# Patient Record
Sex: MALE | Race: WHITE | NOT HISPANIC OR LATINO | Employment: OTHER | URBAN - METROPOLITAN AREA
[De-identification: names, ages, dates, MRNs, and addresses within clinical notes are randomized per-mention and may not be internally consistent; named-entity substitution may affect disease eponyms.]

---

## 2017-06-08 RX ORDER — ALPRAZOLAM 0.25 MG/1
0.25 TABLET ORAL NIGHTLY PRN
Qty: 30 TAB | Refills: 0
Start: 2017-06-08

## 2017-06-08 NOTE — TELEPHONE ENCOUNTER
Was the patient seen in the last year in this department? No  does not have a future appointment, refilled 09/13/16    Does patient have an active prescription for medications requested? No     Received Request Via: Pharmacy

## 2019-09-16 ENCOUNTER — OFFICE VISIT (OUTPATIENT)
Dept: URGENT CARE | Facility: PHYSICIAN GROUP | Age: 34
End: 2019-09-16
Payer: COMMERCIAL

## 2019-09-16 VITALS
SYSTOLIC BLOOD PRESSURE: 130 MMHG | BODY MASS INDEX: 25.67 KG/M2 | HEIGHT: 74 IN | OXYGEN SATURATION: 98 % | DIASTOLIC BLOOD PRESSURE: 80 MMHG | HEART RATE: 87 BPM | TEMPERATURE: 98.3 F | RESPIRATION RATE: 12 BRPM | WEIGHT: 200 LBS
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
## 2019-09-16 PROCEDURE — 99213 OFFICE O/P EST LOW 20 MIN: CPT | Performed by: FAMILY MEDICINE

## 2019-09-16 RX ORDER — AMOXICILLIN AND CLAVULANATE POTASSIUM 875; 125 MG/1; MG/1
TABLET, FILM COATED ORAL
COMMUNITY
Start: 2019-09-15 | End: 2022-11-29

## 2019-09-16 SDOH — HEALTH STABILITY: MENTAL HEALTH: HOW OFTEN DO YOU HAVE A DRINK CONTAINING ALCOHOL?: 2-4 TIMES A MONTH

## 2019-09-16 NOTE — PROGRESS NOTES
"Subjective:      Chief Complaint   Patient presents with   • Dog Bite     R lower face, fever, [id number]                                  S/p dog   bite to  Right side of face 3 d ago.   He was started on  Augmentin yesterday.   He notes no worsening pain.   + subj fever yesterday, but none today.   Describes pain as soreness over rt cheek.            Social History     Tobacco Use   • Smoking status: Never Smoker   • Smokeless tobacco: Never Used   Substance Use Topics   • Alcohol use: Yes     Frequency: 2-4 times a month   • Drug use: Not on file           Current Outpatient Medications on File Prior to Visit   Medication Sig Dispense Refill   • amoxicillin-clavulanate (AUGMENTIN) 875-125 MG Tab      • alprazolam (XANAX) 0.25 MG Tab Take 1 Tab by mouth at bedtime as needed for Sleep. 30 Tab 0   • oseltamivir (TAMIFLU) 75 MG Cap Take 1 Cap by mouth 2 times a day. 10 Cap 0     No current facility-administered medications on file prior to visit.           Past medical history was unremarkable and not pertinent to current issue             Review of Systems   Constitutional: Negative for  chills and malaise/fatigue.   Eyes: Negative for vision changes, d/c.    Respiratory: Negative for cough and sputum production.    Cardiovascular: Negative for chest pain and palpitations.   Gastrointestinal: Negative for nausea, vomiting, abdominal pain, diarrhea and constipation.   Genitourinary: Negative for dysuria, urgency and frequency.   Skin: Negative for rash or  itching.   Neurological: Negative for dizziness and tingling.   Psychiatric/Behavioral: Negative for depression.   Hematologic/lymphatic - denies bruising or excessive bleeding  All other systems reviewed and are negative.       Objective:     /80   Pulse 87   Temp 36.8 °C (98.3 °F) (Temporal)   Resp 12   Ht 1.88 m (6' 2\")   Wt 90.7 kg (200 lb)   SpO2 98%       Physical Exam   Constitutional:  he is oriented to person, place, and time. Pt appears " well-developed and well-nourished. No distress.   HENT:   Face - there are several jagged, scabbed over lacerations over rt cheek, partially obscured by his beard.   There is no induration or fluctuance.   No discharge.   Head: Normocephalic and atraumatic.   Eyes: Conjunctivae are normal.   Cardiovascular: Normal rate, regular rhythm and normal heart sounds.    Pulmonary/Chest: Effort normal and breath sounds normal. No respiratory distress. Pt has no wheezes.   Musculoskeletal:   Neurological: pt is alert and oriented to person, place, and time. No cranial nerve deficit.   Skin: Skin is warm. Pt is not diaphoretic. No erythema.   Nursing note and vitals reviewed.              Assessment/Plan:       1. Dog bite, initial encounter    Pt presents 2 days after dog bite to face    He was started on augmentin yesterday - advised to continue this.     TDaP was given at time of injury    He was advised to RTC for worsening pain, fever, wound d/c, or if he does not start to see improvement in the next 2-3 days.

## 2022-11-03 ENCOUNTER — APPOINTMENT (OUTPATIENT)
Dept: URGENT CARE | Facility: PHYSICIAN GROUP | Age: 37
End: 2022-11-03
Payer: COMMERCIAL

## 2022-11-16 ENCOUNTER — HOSPITAL ENCOUNTER (OUTPATIENT)
Dept: RADIOLOGY | Facility: MEDICAL CENTER | Age: 37
End: 2022-11-16
Attending: PHYSICIAN ASSISTANT
Payer: COMMERCIAL

## 2022-11-16 ENCOUNTER — OFFICE VISIT (OUTPATIENT)
Dept: URGENT CARE | Facility: PHYSICIAN GROUP | Age: 37
End: 2022-11-16
Payer: COMMERCIAL

## 2022-11-16 VITALS
HEART RATE: 99 BPM | DIASTOLIC BLOOD PRESSURE: 100 MMHG | BODY MASS INDEX: 26.51 KG/M2 | TEMPERATURE: 99 F | HEIGHT: 73 IN | SYSTOLIC BLOOD PRESSURE: 140 MMHG | RESPIRATION RATE: 16 BRPM | OXYGEN SATURATION: 97 % | WEIGHT: 200 LBS

## 2022-11-16 DIAGNOSIS — R05.3 CHRONIC COUGH: ICD-10-CM

## 2022-11-16 PROCEDURE — 99204 OFFICE O/P NEW MOD 45 MIN: CPT | Performed by: PHYSICIAN ASSISTANT

## 2022-11-16 PROCEDURE — 71046 X-RAY EXAM CHEST 2 VIEWS: CPT

## 2022-11-16 RX ORDER — BENZONATATE 100 MG/1
100 CAPSULE ORAL 3 TIMES DAILY PRN
Qty: 21 CAPSULE | Refills: 0 | Status: SHIPPED | OUTPATIENT
Start: 2022-11-16 | End: 2022-11-29

## 2022-11-16 RX ORDER — PREDNISONE 20 MG/1
40 TABLET ORAL DAILY
Qty: 10 TABLET | Refills: 0 | Status: SHIPPED | OUTPATIENT
Start: 2022-11-16 | End: 2022-11-21

## 2022-11-16 RX ORDER — DEXTROMETHORPHAN HYDROBROMIDE AND PROMETHAZINE HYDROCHLORIDE 15; 6.25 MG/5ML; MG/5ML
5 SYRUP ORAL EVERY 4 HOURS PRN
Qty: 120 ML | Refills: 0 | Status: SHIPPED | OUTPATIENT
Start: 2022-11-16 | End: 2022-11-29

## 2022-11-16 NOTE — PROGRESS NOTES
"Subjective:   Cordell Kent is a 37 y.o. male who presents for Cough (6 -7 months when started. When first started antibiotics helped but never went away. Morning or through out the day can cough up green stuff. )      HPI  The patient presents to the Urgent Care with complaints of a cough onset 6 months ago.  Symptoms started after a cold.  The cough has been lingering and waxing and waning.  He eventually had a telemetry doc visit and he was placed on doxycycline.  He states it seemed to somewhat help with the cough but no resolution.  He had a telemetry doc visit last week and he was given albuterol inhaler and recommended Zyrtec due to possible allergies.  The cough is worse in the morning.  There is a large amount of the day throughout the day where he is not coughing at all. Denies any recent fever, chills, chest pain, hemoptysis difficulty breathing, vomiting. Nonsmoker. History of allergies once in a while but nothing significant.  He is otherwise healthy.        Medications:    ALPRAZolam Tabs  amoxicillin-clavulanate Tabs  oseltamivir Caps    Allergies: Patient has no known allergies.    Problem List: Cordell Kent does not have a problem list on file.    Surgical History:  No past surgical history on file.    Past Social Hx: Cordell Kent  reports that he has never smoked. He has never used smokeless tobacco. He reports current alcohol use. He reports that he does not use drugs.     Past Family Hx:  Cordell Kent family history is not on file.     Problem list, medications, and allergies reviewed by myself today in Epic.     Objective:     BP (!) 140/100 (BP Location: Left arm, Patient Position: Sitting, BP Cuff Size: Adult)   Pulse 99   Temp 37.2 °C (99 °F) (Temporal)   Resp 16   Ht 1.854 m (6' 1\")   Wt 90.7 kg (200 lb)   SpO2 97%   BMI 26.39 kg/m²     Physical Exam  Vitals reviewed.   Constitutional:       General: He is not in acute distress.     Appearance: Normal appearance. He is not " ill-appearing or toxic-appearing.   HENT:      Head: Normocephalic.      Nose: Nose normal.      Mouth/Throat:      Mouth: Mucous membranes are moist.      Pharynx: Oropharynx is clear. No oropharyngeal exudate or posterior oropharyngeal erythema.   Eyes:      Conjunctiva/sclera: Conjunctivae normal.      Pupils: Pupils are equal, round, and reactive to light.   Cardiovascular:      Rate and Rhythm: Normal rate and regular rhythm.      Heart sounds: Normal heart sounds.   Pulmonary:      Effort: Pulmonary effort is normal. No respiratory distress.      Breath sounds: Normal breath sounds. No wheezing, rhonchi or rales.   Musculoskeletal:      Cervical back: Neck supple.   Lymphadenopathy:      Cervical: No cervical adenopathy.   Skin:     General: Skin is warm and dry.   Neurological:      General: No focal deficit present.      Mental Status: He is alert and oriented to person, place, and time.   Psychiatric:         Mood and Affect: Mood normal.         Behavior: Behavior normal.       RADIOLOGY RESULTS   DX-CHEST-2 VIEWS    Result Date: 11/16/2022 11/16/2022 2:24 PM HISTORY/REASON FOR EXAM:  Cough TECHNIQUE/EXAM DESCRIPTION AND NUMBER OF VIEWS: Two views of the chest. COMPARISON:  None. FINDINGS: No pulmonary infiltrates or consolidations are noted. No pleural effusions, no pneumothorax are appreciated. Normal cardiopericardial silhouette.     1. No active cardiopulmonary abnormalities are identified.           Diagnosis and associated orders:     1. Chronic cough  - DX-CHEST-2 VIEWS; Future  - predniSONE (DELTASONE) 20 MG Tab; Take 2 Tablets by mouth every day for 5 days.  Dispense: 10 Tablet; Refill: 0  - benzonatate (TESSALON) 100 MG Cap; Take 1 Capsule by mouth 3 times a day as needed for Cough.  Dispense: 21 Capsule; Refill: 0  - promethazine-dextromethorphan (PROMETHAZINE-DM) 6.25-15 MG/5ML syrup; Take 5 mL by mouth every four hours as needed for Cough.  Dispense: 120 mL; Refill: 0  - Referral to establish  with [facility] PCP     Comments/MDM:     X-ray results per radiologist interpretation above. I personally reviewed images and radiologist report which showed no abnormalities.   Discussed with patient unclear etiology of his chronic cough.  Patient is otherwise well-appearing in no acute distress.  Normal oxygen.  Afebrile.  No evidence of pneumonia.  Trial of medication as above.  Avoid any triggers.  Continue Zyrtec.    Referral placed to establish care with PCP.       I personally reviewed prior external notes and test results pertinent to today's visit. Pathogenesis of diagnosis discussed including typical length and natural progression. Supportive care, natural history, differential diagnoses, and indications for immediate follow-up discussed. Patient expresses understanding and agrees to plan. Patient denies any other questions or concerns.     Follow-up with the primary care physician for recheck, reevaluation, and consideration of further management.    Please note that this dictation was created using voice recognition software. I have made a reasonable attempt to correct obvious errors, but I expect that there are errors of grammar and possibly content that I did not discover before finalizing the note.    This note was electronically signed by Lm Gant PA-C

## 2022-11-17 ENCOUNTER — TELEPHONE (OUTPATIENT)
Dept: SCHEDULING | Facility: IMAGING CENTER | Age: 37
End: 2022-11-17

## 2022-11-29 ENCOUNTER — OFFICE VISIT (OUTPATIENT)
Dept: MEDICAL GROUP | Facility: PHYSICIAN GROUP | Age: 37
End: 2022-11-29
Payer: COMMERCIAL

## 2022-11-29 VITALS
RESPIRATION RATE: 13 BRPM | TEMPERATURE: 98 F | HEIGHT: 73 IN | OXYGEN SATURATION: 97 % | BODY MASS INDEX: 25.84 KG/M2 | HEART RATE: 97 BPM | SYSTOLIC BLOOD PRESSURE: 138 MMHG | DIASTOLIC BLOOD PRESSURE: 74 MMHG | WEIGHT: 195 LBS

## 2022-11-29 DIAGNOSIS — Z00.00 WELLNESS EXAMINATION: ICD-10-CM

## 2022-11-29 DIAGNOSIS — R05.3 CHRONIC COUGH: ICD-10-CM

## 2022-11-29 DIAGNOSIS — Z11.59 NEED FOR HEPATITIS C SCREENING TEST: ICD-10-CM

## 2022-11-29 DIAGNOSIS — Z76.89 ENCOUNTER TO ESTABLISH CARE: ICD-10-CM

## 2022-11-29 DIAGNOSIS — E78.5 DYSLIPIDEMIA: ICD-10-CM

## 2022-11-29 PROBLEM — R05.9 COUGH: Status: ACTIVE | Noted: 2022-11-29

## 2022-11-29 PROCEDURE — 99395 PREV VISIT EST AGE 18-39: CPT | Performed by: STUDENT IN AN ORGANIZED HEALTH CARE EDUCATION/TRAINING PROGRAM

## 2022-11-29 ASSESSMENT — PATIENT HEALTH QUESTIONNAIRE - PHQ9: CLINICAL INTERPRETATION OF PHQ2 SCORE: 0

## 2022-11-29 NOTE — PROGRESS NOTES
Subjective:     CC:  establish care    HISTORY OF THE PRESENT ILLNESS: Patient is a 37 y.o. male here today to establish care and discuss cough.    Cough  Seen at Urgent Care 11/16/22 with cough x 6 months that started after a cold. He had a teledoc visit and was placed on doxycycline.  He had another teledoc visit and was given albuterol inhaler and recommended Zyrtec due to possible allergies.  CXR in urgent care was unremarkable.  Patient was given prednisone x 5 days, tessalon perles, and promethazine.  Patient reports the cough is worst in the morning and is productive with green phlegm.  Patient reports minimal improvement with the albuterol inhaler for 1 week and Zyrtec for 2 weeks.  Patient has been doing saline nasal rinses which she reports slightly helped.  Patient feels a constant need to clear his throat and reports he only occasionally has seasonal allergies.  Patient has cats and dogs at home.    Dyslipidemia  Patient reports he had labs done 2 years ago for life insurance and at that time cholesterol was elevated.    Health Maintenance: Completed  Cholesterol Screening: Ordered today  Diabetes Screening: Ordered today  Diet / Exercise: BMI 25.73  Smoking: denies  Substance Abuse: reports drinking 3-4 alcoholic beverages 3-4 nights per week but denies illicit drug use  Safe in relationship: yes,     Infectious disease screening/Immunizations  --STI Screening: declined  --Practices safe sex.  --Hepatitis C Screening: ordered today  --Immunizations:    Influenza: declined   Tetanus: UTD     Hepatitis B: patient will check his vaccine records  COVID-19: declined    No Known Allergies  Patient Active Problem List   Diagnosis    Cough    Dyslipidemia     No current outpatient medications on file.     No current facility-administered medications for this visit.     Past Surgical History:   Procedure Laterality Date    OTHER ORTHOPEDIC SURGERY Left     labrum tear    TONSILLECTOMY        Social  History     Socioeconomic History    Marital status:      Spouse name: Not on file    Number of children: 1    Years of education: Not on file    Highest education level: Not on file   Occupational History    Occupation: self-employed tech   Tobacco Use    Smoking status: Never    Smokeless tobacco: Never   Vaping Use    Vaping Use: Never used   Substance and Sexual Activity    Alcohol use: Yes     Comment: 3-4 drinks 3-4 nights/wk    Drug use: Never    Sexual activity: Yes     Partners: Female     Birth control/protection: Female Sterilization     Comment: wife   Other Topics Concern    Not on file   Social History Narrative    Lives with wife and son.     Social Determinants of Health     Financial Resource Strain: Not on file   Food Insecurity: Not on file   Transportation Needs: Not on file   Physical Activity: Not on file   Stress: Not on file   Social Connections: Not on file   Intimate Partner Violence: Not on file   Housing Stability: Not on file     Family History   Problem Relation Age of Onset    Breast Cancer Maternal Grandmother     Ovarian Cancer Neg Hx     Tubal Cancer Neg Hx     Peritoneal Cancer Neg Hx     Colorectal Cancer Neg Hx          ROS:     Constitutional:  Negative for chills, fever, fatigue, weight loss.  HEENT:  Negative for blurred vision, hearing loss, sore throat.    Respiratory: Positive for cough with green sputum production but negative for shortness of breath.  Cardiovascular:  Negative for chest pain, palpitations and leg swelling.  Gastrointestinal:  Negative for abdominal pain, blood in stool, constipation, diarrhea and vomiting.   Musculoskeletal:  Negative for back pain, falls, joint pain and neck pain.   Skin:  Negative for rash.   Neurological:  Negative for dizziness, seizures, weakness and headaches.   Endo/Heme/Allergies:  Does not bruise/bleed easily.   Psychiatric/Behavioral:  Negative for depression, anxiety and suicidal thoughts.      Objective:     Exam: BP  "138/74   Pulse 97   Temp 36.7 °C (98 °F) (Temporal)   Resp 13   Ht 1.854 m (6' 1\")   Wt 88.5 kg (195 lb)   SpO2 97%  Body mass index is 25.73 kg/m².    Gen: Alert and oriented, no acute distress.  Eyes:  PERRL, conjunctivae clear, lids normal.   ENMT: Lips without lesions, good dentition, moist mucous membranes, no oropharyngeal erythema or exudate.  Neck: Neck is supple, trachea middle, no palpable lymphadenopathy or thyromegaly.  Lungs: Normal effort, CTAB, no wheezing / rhonchi / rales.  CV: RRR, normal S1 and S2, no murmurs.  GI:  Abdomen soft, non-tender, non-distended with normal bowel sounds.  MSK:  Normal ROM.  Ext: No clubbing, cyanosis, or edema.  Skin:  Warm and dry with no rashes or lesions.  Neuro: AAO x 3, no acute focal deficits.  Psych: Normal affect and mood.      Assessment & Plan:   37 y.o. male with the following -    1. Encounter to establish care  2. Wellness examination  Patient presents today to establish care and annual preventive exam was done.  Routine labs ordered.  Patient will check his records to see if he received the hepatitis B vaccines.  - CBC WITH DIFFERENTIAL; Future  - Comp Metabolic Panel; Future    3. Chronic cough  Chronic.  Morning cough present for the past 6 months.  No improvement with doxycycline, albuterol inhaler, Zyrtec.  We had a lengthy discussion about the causes of a chronic cough.  It is unlikely that the patient has asthma and he denies any shortness of breath.  Symptoms may possibly be due to allergies or acid reflux.  We discussed doing a 1 month trial of Zyrtec plus Flonase daily followed by a 1 month trial of daily PPI.  Follow-up in 2 months and if no improvement consider referral to ENT.    4. Dyslipidemia  Chronic.  Patient reports elevated cholesterol in the past.  Repeat lipid panel ordered.  - Lipid Profile; Future    5. Need for hepatitis C screening test  One-time screening for hepatitis C ordered.  - HCV Scrn ( [id number] 1xLife); " Future          Patient Counseling:  --Discussed moderation in sodium/caffeine intake, saturated fat and cholesterol, caloric balance, sufficient fresh fruits/vegetables, fiber.  --Discussed brushing, flossing, and dental visits.   --Encouraged 150 minutes of exercise weekly.   --Discussed tobacco, alcohol, and other drug use.   --Discussed safety belts, safety helmets, smoke detector, gun safety etc.  --Discussed sun protection with minimum of spf 30.      Return in about 2 months (around 1/29/2023) for follow-up cough.    Please note that this dictation was created using voice recognition software. I have made every reasonable attempt to correct obvious errors, but I expect that there are errors of grammar and possibly content that I did not discover before finalizing the note.

## 2022-11-29 NOTE — ASSESSMENT & PLAN NOTE
Seen at Urgent Care 11/16/22 with cough x 6 months that started after a cold. He had a teledoc visit and was placed on doxycycline.  He had another teledoc visit and was given albuterol inhaler and recommended Zyrtec due to possible allergies.  CXR in urgent care was unremarkable.  Patient was given prednisone x 5 days, tessalon perles, and promethazine.  Patient reports the cough is worst in the morning and is productive with green phlegm.  Patient reports minimal improvement with the albuterol inhaler for 1 week and Zyrtec for 2 weeks.  Patient has been doing saline nasal rinses which she reports slightly helped.  Patient feels a constant need to clear his throat and reports he only occasionally has seasonal allergies.  Patient has cats and dogs at home.

## 2022-11-29 NOTE — ASSESSMENT & PLAN NOTE
Patient reports he had labs done 2 years ago for life insurance and at that time cholesterol was elevated.

## 2022-12-28 ENCOUNTER — OFFICE VISIT (OUTPATIENT)
Dept: MEDICAL GROUP | Facility: PHYSICIAN GROUP | Age: 37
End: 2022-12-28
Payer: COMMERCIAL

## 2022-12-28 ENCOUNTER — HOSPITAL ENCOUNTER (EMERGENCY)
Facility: MEDICAL CENTER | Age: 37
End: 2022-12-28
Attending: EMERGENCY MEDICINE
Payer: COMMERCIAL

## 2022-12-28 VITALS
HEART RATE: 82 BPM | OXYGEN SATURATION: 97 % | WEIGHT: 197.31 LBS | SYSTOLIC BLOOD PRESSURE: 173 MMHG | BODY MASS INDEX: 26.03 KG/M2 | DIASTOLIC BLOOD PRESSURE: 99 MMHG | RESPIRATION RATE: 18 BRPM | TEMPERATURE: 97.9 F

## 2022-12-28 VITALS
OXYGEN SATURATION: 98 % | HEIGHT: 73 IN | BODY MASS INDEX: 26.11 KG/M2 | WEIGHT: 197 LBS | SYSTOLIC BLOOD PRESSURE: 140 MMHG | DIASTOLIC BLOOD PRESSURE: 92 MMHG | RESPIRATION RATE: 13 BRPM | TEMPERATURE: 98.1 F | HEART RATE: 87 BPM

## 2022-12-28 DIAGNOSIS — G96.00 CSF LEAK: ICD-10-CM

## 2022-12-28 DIAGNOSIS — J32.3 CHRONIC SPHENOIDAL SINUSITIS: ICD-10-CM

## 2022-12-28 DIAGNOSIS — G96.01 CSF (CEREBROSPINAL RHINORRHEA): ICD-10-CM

## 2022-12-28 LAB
ANION GAP SERPL CALC-SCNC: 12 MMOL/L (ref 7–16)
APTT PPP: 28.4 SEC (ref 24.7–36)
BASOPHILS # BLD AUTO: 0.3 % (ref 0–1.8)
BASOPHILS # BLD: 0.03 K/UL (ref 0–0.12)
BUN SERPL-MCNC: 11 MG/DL (ref 8–22)
CALCIUM SERPL-MCNC: 10.1 MG/DL (ref 8.5–10.5)
CHLORIDE SERPL-SCNC: 103 MMOL/L (ref 96–112)
CO2 SERPL-SCNC: 24 MMOL/L (ref 20–33)
CREAT SERPL-MCNC: 0.93 MG/DL (ref 0.5–1.4)
EOSINOPHIL # BLD AUTO: 0.03 K/UL (ref 0–0.51)
EOSINOPHIL NFR BLD: 0.3 % (ref 0–6.9)
ERYTHROCYTE [DISTWIDTH] IN BLOOD BY AUTOMATED COUNT: 40.1 FL (ref 35.9–50)
GFR SERPLBLD CREATININE-BSD FMLA CKD-EPI: 109 ML/MIN/1.73 M 2
GLUCOSE SERPL-MCNC: 105 MG/DL (ref 65–99)
HCT VFR BLD AUTO: 49.3 % (ref 42–52)
HGB BLD-MCNC: 16.6 G/DL (ref 14–18)
IMM GRANULOCYTES # BLD AUTO: 0.04 K/UL (ref 0–0.11)
IMM GRANULOCYTES NFR BLD AUTO: 0.4 % (ref 0–0.9)
INR PPP: 1.02 (ref 0.87–1.13)
LYMPHOCYTES # BLD AUTO: 1.51 K/UL (ref 1–4.8)
LYMPHOCYTES NFR BLD: 13.4 % (ref 22–41)
MCH RBC QN AUTO: 29.4 PG (ref 27–33)
MCHC RBC AUTO-ENTMCNC: 33.7 G/DL (ref 33.7–35.3)
MCV RBC AUTO: 87.4 FL (ref 81.4–97.8)
MONOCYTES # BLD AUTO: 0.72 K/UL (ref 0–0.85)
MONOCYTES NFR BLD AUTO: 6.4 % (ref 0–13.4)
NEUTROPHILS # BLD AUTO: 8.93 K/UL (ref 1.82–7.42)
NEUTROPHILS NFR BLD: 79.2 % (ref 44–72)
NRBC # BLD AUTO: 0 K/UL
NRBC BLD-RTO: 0 /100 WBC
PLATELET # BLD AUTO: 363 K/UL (ref 164–446)
PMV BLD AUTO: 9.8 FL (ref 9–12.9)
POTASSIUM SERPL-SCNC: 4.2 MMOL/L (ref 3.6–5.5)
PROTHROMBIN TIME: 13.2 SEC (ref 12–14.6)
RBC # BLD AUTO: 5.64 M/UL (ref 4.7–6.1)
SODIUM SERPL-SCNC: 139 MMOL/L (ref 135–145)
WBC # BLD AUTO: 11.3 K/UL (ref 4.8–10.8)

## 2022-12-28 PROCEDURE — 80048 BASIC METABOLIC PNL TOTAL CA: CPT

## 2022-12-28 PROCEDURE — 99283 EMERGENCY DEPT VISIT LOW MDM: CPT

## 2022-12-28 PROCEDURE — 99215 OFFICE O/P EST HI 40 MIN: CPT | Performed by: STUDENT IN AN ORGANIZED HEALTH CARE EDUCATION/TRAINING PROGRAM

## 2022-12-28 PROCEDURE — 85730 THROMBOPLASTIN TIME PARTIAL: CPT

## 2022-12-28 PROCEDURE — 85610 PROTHROMBIN TIME: CPT

## 2022-12-28 PROCEDURE — 85025 COMPLETE CBC W/AUTO DIFF WBC: CPT

## 2022-12-28 PROCEDURE — 36415 COLL VENOUS BLD VENIPUNCTURE: CPT

## 2022-12-28 RX ORDER — AMOXICILLIN AND CLAVULANATE POTASSIUM 875; 125 MG/1; MG/1
1 TABLET, FILM COATED ORAL 2 TIMES DAILY
Qty: 28 TABLET | Refills: 0 | Status: SHIPPED | OUTPATIENT
Start: 2022-12-28 | End: 2023-01-11

## 2022-12-28 NOTE — ED TRIAGE NOTES
Chief Complaint   Patient presents with    Sent by MD     Seen by MD, sent pt over for a CSF leak per PCP who witnessed fluid drain from nose.        BP (!) 173/99   Pulse 82   Temp 36.6 °C (97.9 °F) (Temporal)   Resp 18   SpO2 97%

## 2022-12-28 NOTE — PROGRESS NOTES
"Subjective:     CC: Fluid leaking from nose    HPI:   Cordell presents today with clear fluid leaking from his nose.  At the last visit patient reported chronic cough for 9 months for which we were doing a trial of Zyrtec plus Flonase followed by a trial of PPI.  Patient reports 2 weeks after starting Flonase he developed headaches and eventually started getting nosebleeds.  Patient stopped the Flonase 10 to 12 days ago.  Patient reports yesterday he bent over in his garage and when he stood up 10 to 15 drops of clear fluid came out of his nose.  Patient reports he bent over again last night and once again clear fluid came out of his nose.  Patient was concerned about a CSF leak and had imaging done at his friend's dental office this morning which showed an abnormality but no radiology report is available yet.  During our visit the patient bent over and a few drops of clear fluid fell out of his nose onto tissue that appeared to have a halo sign.  Patient denies any head trauma in the past.  Patient was advised to go to the emergency room for further evaluation and agreed.    Health Maintenance: Completed    ROS:  Negative except as stated above.    Objective:     Exam:  BP (!) 140/92   Pulse 87   Temp 36.7 °C (98.1 °F) (Temporal)   Resp 13   Ht 1.854 m (6' 1\")   Wt 89.4 kg (197 lb)   SpO2 98%   BMI 25.99 kg/m²  Body mass index is 25.99 kg/m².    Physical Exam    Gen: Alert and oriented, no acute distress.  Nose:  Drops of clear fluid fell from the nose when patient bent over.  Lungs: Normal effort, CTAB, no wheezing / rhonchi / rales.  CV: RRR, normal S1 and S2, no murmurs.    Assessment & Plan:     37 y.o. male with the following -     1. CSF (cerebrospinal rhinorrhea)  Findings are concerning for CSF leak.  Patient had imaging done this morning but no radiology report is available yet.  I advised the patient to go to the emergency room for further evaluation and he agreed.      Return in about 4 weeks " (around 1/25/2023) for cough, rhinorrhea.    Please note that this dictation was created using voice recognition software. I have made every reasonable attempt to correct obvious errors, but I expect that there are errors of grammar and possibly content that I did not discover before finalizing the note.

## 2022-12-29 DIAGNOSIS — G96.00 CSF LEAK: ICD-10-CM

## 2022-12-29 DIAGNOSIS — J32.3 CHRONIC SPHENOIDAL SINUSITIS: ICD-10-CM

## 2022-12-29 NOTE — ED PROVIDER NOTES
ED Provider Note  2  ED Provider Note    History limited by: nothing    CHIEF COMPLAINT  Chief Complaint   Patient presents with    Sent by MD     Seen by MD, sent pt over for a CSF leak per PCP who witnessed fluid drain from nose.       DILSHAD Kent is a 37 y.o. male who presents to the Emergency Department sent in by urgent care for concerns of a CSF leak.  The patient has had a chronic cough for 9 months that tends to be worse in the morning and tends to be associated with green sputum.  He has taken a course of doxycycline which he tolerated poorly without benefit.  He has been tried on antihistamines, albuterol without benefit.  His doctor's next plan is to empirically treat for GERD.  For the last 2 to 3 days when he bends over a watery material comes out of the left side of his nose.  Today at urgent care this was tested on paper and produced a halo consistent with CSF.  He denies headache, fever, ill appearance, sinus pain, severe congestion.  No head trauma.  Over the last 9 months he has had intermittent random fevers every week to a month.  No diabetes or immune compromise.  A dentist friend performed a CT head on the patient today demonstrating left sphenoid sinus wall erosion near the carotid.      REVIEW OF SYSTEMS  Pertinent positives include: Leakage of fluid from left nostril, persistent cough productive of green phlegm.  Pertinent negatives include: Fever, shortness of breath, headache, neck stiffness, vomiting, ill appearance.  10+ systems reviewed and negative.      PAST MEDICAL HISTORY  None including no diabetes or known immune compromise    FAMILY HISTORY  Family History   Problem Relation Age of Onset    Breast Cancer Maternal Grandmother     Ovarian Cancer Neg Hx     Tubal Cancer Neg Hx     Peritoneal Cancer Neg Hx     Colorectal Cancer Neg Hx        SOCIAL HISTORY  Social History     Tobacco Use    Smoking status: Never    Smokeless tobacco: Never   Vaping Use    Vaping Use: Never  used   Substance Use Topics    Alcohol use: Yes     Comment: 3-4 drinks 3-4 nights/wk    Drug use: Never     Social History     Substance and Sexual Activity   Drug Use Never       SURGICAL HISTORY  Past Surgical History:   Procedure Laterality Date    OTHER ORTHOPEDIC SURGERY Left     labrum tear    TONSILLECTOMY         CURRENT MEDICATIONS  Albuterol, antihistamine    ALLERGIES  No Known Allergies    PHYSICAL EXAM  VITAL SIGNS: BP (!) 173/99   Pulse 82   Temp 36.6 °C (97.9 °F) (Temporal)   Resp 18   Wt 89.5 kg (197 lb 5 oz)   SpO2 97%   BMI 26.03 kg/m²   Reviewed and elevated blood pressure, afebrile.  Constitutional: Well developed, Well nourished, afebrile, elevated blood pressure, no hypoxia room air.  HENT: Normocephalic, atraumatic, bilateral external ears normal, wearing a mask.  Mild nasal mucosal edema bilaterally with scant dried mucus, no sinus tenderness, no meningismus or nuchal rigidity, no active drainage from the nostril, no intraoral erythema edema or exudate  Eyes: PERRLA, conjunctiva pink, no scleral icterus.   Cardiovascular: Regular rate and rhythm. No murmurs, rubs or gallops.  No dependent edema or calf tenderness  Respiratory: Lungs clear to auscultation bilaterally. No wheezes, rales, or rhonchi.  No frequent cough  Abdominal:  Abdomen soft, non-tender, non distended. No rebound, or guarding.    Skin: No erythema, no rash.   Genitourinary: No costovertebral angle tenderness.   Musculoskeletal: no deformities.   Neurologic: Alert & oriented x 3, cranial nerves 2-12 intact by passive exam.  No focal deficit noted.  Psychiatric: Affect normal, Judgment normal, Mood normal.     ED COURSE:  5:02 PM - Patient seen and examined at bedside.   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    Radiologist interpretation have been reviewed by me.       LABORATORY Ordered and Reviewed by Me:  Results for orders placed  or performed during the hospital encounter of 12/28/22   CBC WITH DIFFERENTIAL   Result Value Ref Range    WBC 11.3 (H) 4.8 - 10.8 K/uL    RBC 5.64 4.70 - 6.10 M/uL    Hemoglobin 16.6 14.0 - 18.0 g/dL    Hematocrit 49.3 42.0 - 52.0 %    MCV 87.4 81.4 - 97.8 fL    MCH 29.4 27.0 - 33.0 pg    MCHC 33.7 33.7 - 35.3 g/dL    RDW 40.1 35.9 - 50.0 fL    Platelet Count 363 164 - 446 K/uL    MPV 9.8 9.0 - 12.9 fL    Neutrophils-Polys 79.20 (H) 44.00 - 72.00 %    Lymphocytes 13.40 (L) 22.00 - 41.00 %    Monocytes 6.40 0.00 - 13.40 %    Eosinophils 0.30 0.00 - 6.90 %    Basophils 0.30 0.00 - 1.80 %    Immature Granulocytes 0.40 0.00 - 0.90 %    Nucleated RBC 0.00 /100 WBC    Neutrophils (Absolute) 8.93 (H) 1.82 - 7.42 K/uL    Lymphs (Absolute) 1.51 1.00 - 4.80 K/uL    Monos (Absolute) 0.72 0.00 - 0.85 K/uL    Eos (Absolute) 0.03 0.00 - 0.51 K/uL    Baso (Absolute) 0.03 0.00 - 0.12 K/uL    Immature Granulocytes (abs) 0.04 0.00 - 0.11 K/uL    NRBC (Absolute) 0.00 K/uL   Basic Metabolic Panel   Result Value Ref Range    Sodium 139 135 - 145 mmol/L    Potassium 4.2 3.6 - 5.5 mmol/L    Chloride 103 96 - 112 mmol/L    Co2 24 20 - 33 mmol/L    Glucose 105 (H) 65 - 99 mg/dL    Bun 11 8 - 22 mg/dL    Creatinine 0.93 0.50 - 1.40 mg/dL    Calcium 10.1 8.5 - 10.5 mg/dL    Anion Gap 12.0 7.0 - 16.0   Prothrombin Time   Result Value Ref Range    PT 13.2 12.0 - 14.6 sec    INR 1.02 0.87 - 1.13   APTT   Result Value Ref Range    APTT 28.4 24.7 - 36.0 sec   ESTIMATED GFR   Result Value Ref Range    GFR (CKD-EPI) 109 >60 mL/min/1.73 m 2       External Records Reviewed:   External CT report reviewed as above    Discussions of Management:    Case discussed with Dr. Pagan ears nose throat and we decided not to treat with antibiotics now unless he develops any symptoms suggesting new acute infection.  She will obtain a MRI of the head as an outpatient.  She will follow the patient up in clinic this week.  The patient is to call tomorrow for his  appointments.    MEDICAL DECISION MAKING:  Differential Diagnosis:  Chronic sinusitis, CSF leak, sphenoid wall erosion, brain abscess, meningitis, acute sinusitis, cavernous sinus thrombosis    Consideration of Hospitalization/Surgery/Testing:   Hospitalization considered for MRI, antibiotics and urgent ENT consultation however Dr. Pagan dissuaded this since this problem in a well-appearing patient is normally worked up and treated as an outpatient.    5:02 PM - Nursing Notes Reviewed, Discharge vitals reviewed:    This patient presents with a probable CSF leak from his left nostril and has sphenoid bone erosion on the left on CT imaging today.  There is no history of trauma.  He is likely had a chronic sinusitis for 9 months.  There is no evidence of acute sinusitis.  There is no evidence of brain abscess, meningitis or cavernous sinus thrombosis.  He has no immune compromise.  This is still an urgent matter for treatment to avoid complications such as brain abscess meningitis and cavernous sinus thrombosis.  Patient plan to [place] this [place] and he was dissuaded from this until he follows up with ENT.    PLAN:  New/Changed Prescription Medication:  Discharge Medication List as of 12/28/2022  6:01 PM        START taking these medications    Details   amoxicillin-clavulanate (AUGMENTIN) 875-125 MG Tab Take 1 Tablet by mouth 2 times a day for 14 days., Disp-28 Tablet, R-0, Print Rx Paper           Augmentin to be started at the first sign of infection    Sinusitis handout given    Return for severe symptoms of infection, neck stiffness, recurrent vomiting, confusion, neurologic symptoms    Followup:  Zee Pagan M.D.  [address] [place] [id number]  [phone]    Call   Call tomorrow for appointment with Dr. Pagan, for MRI order, and to ask if you should travel      CONDITION: Stable.     FINAL IMPRESSION  1. CSF leak    2. Chronic sphenoidal sinusitis          Electronically signed by: Carl Diana M.D., 12/28/2022 5:02 PM

## 2022-12-29 NOTE — DISCHARGE INSTRUCTIONS
Your CSF leak is probably from a chronic sinusitis.  At this point there is no evidence of acute infection and Dr. Guzman prefers that you not to take an antibiotic unless you develop new symptoms such as puslike drainage from the left nostril, fever, face pain, headache.  If you develop severe infection symptoms like severe headache neck pain vomiting and high fever or if you develop any neurologic symptoms like severe headache double vision or other neurologic symptoms return immediately to this ER.

## 2022-12-29 NOTE — ED NOTES
Patient discharged in stable condition per orders. IV access removed - bandage applied. Wristband removed per protocol. Patient verbalized understanding of all discharge instructions. All belongings accounted for. Ambulatory for discharge with steady gait.

## 2022-12-29 NOTE — PROGRESS NOTES
Patient was seen in the clinic yesterday and sent to the emergency room for CSF leak.  CT showed sphenoid bone erosion.  MRI and outpatient follow-up with ENT was advised.  Patient is scheduled to see ENT on 1/11/23.  Urgent MRI and ENT referral ordered today.

## 2023-01-03 ENCOUNTER — HOSPITAL ENCOUNTER (OUTPATIENT)
Dept: RADIOLOGY | Facility: MEDICAL CENTER | Age: 38
End: 2023-01-03
Attending: OTOLARYNGOLOGY
Payer: COMMERCIAL

## 2023-01-03 DIAGNOSIS — G96.01 CEREBROSPINAL FLUID RHINORRHEA: ICD-10-CM

## 2023-01-03 PROCEDURE — A9579 GAD-BASE MR CONTRAST NOS,1ML: HCPCS | Performed by: OTOLARYNGOLOGY

## 2023-01-03 PROCEDURE — 700117 HCHG RX CONTRAST REV CODE 255: Performed by: OTOLARYNGOLOGY

## 2023-01-03 PROCEDURE — 70553 MRI BRAIN STEM W/O & W/DYE: CPT

## 2023-01-03 RX ADMIN — GADOTERIDOL 19 ML: 279.3 INJECTION, SOLUTION INTRAVENOUS at 11:37

## 2023-02-05 ENCOUNTER — TELEPHONE (OUTPATIENT)
Dept: MEDICAL GROUP | Facility: PHYSICIAN GROUP | Age: 38
End: 2023-02-05
Payer: COMMERCIAL

## 2023-09-08 ENCOUNTER — OFFICE VISIT (OUTPATIENT)
Dept: MEDICAL GROUP | Facility: PHYSICIAN GROUP | Age: 38
End: 2023-09-08
Payer: COMMERCIAL

## 2023-09-08 VITALS
HEIGHT: 73 IN | TEMPERATURE: 98.6 F | DIASTOLIC BLOOD PRESSURE: 88 MMHG | SYSTOLIC BLOOD PRESSURE: 136 MMHG | OXYGEN SATURATION: 98 % | HEART RATE: 88 BPM | BODY MASS INDEX: 26.24 KG/M2 | WEIGHT: 198 LBS

## 2023-09-08 DIAGNOSIS — Z00.00 ROUTINE HEALTH MAINTENANCE: ICD-10-CM

## 2023-09-08 DIAGNOSIS — K92.1 BLOOD IN STOOL: ICD-10-CM

## 2023-09-08 DIAGNOSIS — Z11.59 NEED FOR HEPATITIS C SCREENING TEST: ICD-10-CM

## 2023-09-08 DIAGNOSIS — R05.3 CHRONIC COUGH: ICD-10-CM

## 2023-09-08 DIAGNOSIS — Z11.4 ENCOUNTER FOR SCREENING FOR HIV: ICD-10-CM

## 2023-09-08 PROCEDURE — 3079F DIAST BP 80-89 MM HG: CPT | Performed by: STUDENT IN AN ORGANIZED HEALTH CARE EDUCATION/TRAINING PROGRAM

## 2023-09-08 PROCEDURE — 3075F SYST BP GE 130 - 139MM HG: CPT | Performed by: STUDENT IN AN ORGANIZED HEALTH CARE EDUCATION/TRAINING PROGRAM

## 2023-09-08 PROCEDURE — 99214 OFFICE O/P EST MOD 30 MIN: CPT | Performed by: STUDENT IN AN ORGANIZED HEALTH CARE EDUCATION/TRAINING PROGRAM

## 2023-09-08 ASSESSMENT — PATIENT HEALTH QUESTIONNAIRE - PHQ9: CLINICAL INTERPRETATION OF PHQ2 SCORE: 0

## 2023-09-08 NOTE — PROGRESS NOTES
Subjective:     Chief Complaint   Patient presents with    Diarrhea     Diarrhea 1 day on the 4th, abdominal pain, bright red blood in stool x4 days. / started carnivore diet recently       HPI:   Cordell presents today with    Cough  Seen in urgent Care 11/16/22 for cough x 6 months that started after a cold.  Patient had a teledoc visit and was placed on doxycycline.  He had another teledoc visit and was given albuterol inhaler and Zyrtec recommended due to possible allergies.  CXR was unremarkable.  Patient was given prednisone x 5 days, tessalon perles, and promethazine.  Patient still complains of occasional morning cough that is productive with green phlegm.  Patient reports no improvement with albuterol, Zyrtec, Flonase, and PPI.  Patient still feels constant need to clear his throat.  Patient has cats and dogs at home.    Blood in stool  Patient reports 3 weeks ago he started the carnivore diet.  Patient reports on Monday he went to the [facility] where he ate a brisket sandwich with mac & cheese.  Patient reports the following day he had pizza and wings but that night he had an episode of loose stools.  Patient reports on Wednesday he had a few episodes of mucus and blood in the toilet bowl.  Picture on patient's phone showed bright red blood in the toilet without any stool.  Patient reports it was associated with abdominal pain that resolved after the bowel movement.  Patient reports the bleeding has resolved but he occasionally has gas.  Patient denies fever, nausea, vomiting, sick contacts, similar issue in the past, constipation, recent travel, or NSAID use.  Patient reports he last took antibiotics a couple months ago.  Patient denies FH of IBD or cholecystectomy.        Health Maintenance: Completed    ROS:  Negative except as stated above.      Objective:     Exam:  /88 (BP Location: Left arm, Patient Position: Sitting, BP Cuff Size: Adult)   Pulse 88   Temp 37 °C (98.6 °F) (Temporal)   Ht 1.854  "m (6' 1\")   Wt 89.8 kg (198 lb)   SpO2 98%   BMI 26.12 kg/m²  Body mass index is 26.12 kg/m².    Physical Exam    Gen: Alert and oriented, no acute distress.  Eyes:  PERRL, conjunctivae clear, lids normal.   ENMT: Lips without lesions, good dentition, moist mucous membranes.  Neck: Neck is supple, trachea middle, no palpable lymphadenopathy or thyromegaly.  Lungs: Normal effort, CTAB, no wheezing / rhonchi / rales.  CV: RRR, normal S1 and S2, no murmurs.  GI:  Abdomen soft, non-tender, non-distended with normal bowel sounds and no palpable masses.  MSK:  Normal ROM.  Ext: No clubbing, cyanosis, or edema.  Skin:  Warm and dry with no rashes or lesions.  Neuro: AAO x 3, no acute focal deficits.  Psych: Normal affect and mood.      Assessment & Plan:     38 y.o. male with the following -     1. Routine health maintenance  - CBC WITH DIFFERENTIAL; Future  - Comp Metabolic Panel; Future  - Lipid Profile; Future    2. Chronic cough  Chronic, uncontrolled.  Symptoms seem consistent with allergies but patient reported no improvement with albuterol, Zyrtec, Flonase, and PPI.  November 2022 CXR is unremarkable.  Follows up with ENT and reports surgery for deviated septum was recommended.    3. Blood in stool  This is a new problem.  Picture on patient's phone showed bright red blood in the toilet bowl.  Patient reports the bleeding has now resolved.  Cause is unclear.  Possibly due to recent change in diet.  Advised to limit alcohol use. If bleeding returns order work-up for IBD.    4. Need for hepatitis C screening test  - HEP C VIRUS ANTIBODY; Future    5. Encounter for screening for HIV  - HIV AG/AB COMBO ASSAY SCREENING; Future          I spent a total of 35 minutes with record review, exam, communication with the patient, communication with other providers, and documentation of this encounter.      Return in about 3 months (around 12/8/2023) for Annual preventive visit, Discuss labs.    Please note that this dictation " was created using voice recognition software. I have made every reasonable attempt to correct obvious errors, but I expect that there are errors of grammar and possibly content that I did not discover before finalizing the note.

## 2023-09-08 NOTE — ASSESSMENT & PLAN NOTE
Seen in urgent Care 11/16/22 for cough x 6 months that started after a cold.  Patient had a teledoc visit and was placed on doxycycline.  He had another teledoc visit and was given albuterol inhaler and Zyrtec recommended due to possible allergies.  CXR was unremarkable.  Patient was given prednisone x 5 days, tessalon perles, and promethazine.  Patient still complains of occasional morning cough that is productive with green phlegm.  Patient reports no improvement with albuterol, Zyrtec, Flonase, and PPI.  Patient still feels constant need to clear his throat.  Patient has cats and dogs at home.

## 2023-09-08 NOTE — ASSESSMENT & PLAN NOTE
Patient reports 3 weeks ago he started the carnivore diet.  Patient reports on Monday he went to the [facility] fast where he ate a brisket sandwich with mac & cheese.  Patient reports the following day he had pizza and wings but that night he had an episode of loose stools.  Patient reports on Wednesday he had a few episodes of mucus and blood in the toilet bowl.  Picture on patient's phone showed bright red blood in the toilet without any stool.  Patient reports it was associated with abdominal pain that resolved after the bowel movement.  Patient reports the bleeding has resolved but he occasionally has gas.  Patient denies fever, nausea, vomiting, sick contacts, similar issue in the past, constipation, recent travel, or NSAID use.  Patient reports he last took antibiotics a couple months ago.  Patient denies FH of IBD or cholecystectomy.

## 2023-09-26 DIAGNOSIS — K92.1 BLOOD IN STOOL: ICD-10-CM

## 2023-09-26 DIAGNOSIS — R10.9 ABDOMINAL PAIN, UNSPECIFIED ABDOMINAL LOCATION: ICD-10-CM

## 2023-09-28 ENCOUNTER — HOSPITAL ENCOUNTER (OUTPATIENT)
Facility: MEDICAL CENTER | Age: 38
End: 2023-09-28
Attending: STUDENT IN AN ORGANIZED HEALTH CARE EDUCATION/TRAINING PROGRAM
Payer: COMMERCIAL

## 2023-09-28 DIAGNOSIS — K92.1 BLOOD IN STOOL: ICD-10-CM

## 2023-09-28 DIAGNOSIS — R10.9 ABDOMINAL PAIN, UNSPECIFIED ABDOMINAL LOCATION: ICD-10-CM

## 2023-09-28 LAB — HEMOCCULT STL QL: NEGATIVE

## 2023-09-28 PROCEDURE — 82272 OCCULT BLD FECES 1-3 TESTS: CPT

## 2023-09-28 PROCEDURE — 83993 ASSAY FOR CALPROTECTIN FECAL: CPT

## 2023-09-30 LAB — CALPROTECTIN STL-MCNT: 46 UG/G

## 2023-12-20 ENCOUNTER — APPOINTMENT (OUTPATIENT)
Dept: MEDICAL GROUP | Facility: PHYSICIAN GROUP | Age: 38
End: 2023-12-20
Payer: COMMERCIAL

## 2024-08-14 ENCOUNTER — OFFICE VISIT (OUTPATIENT)
Dept: URGENT CARE | Facility: PHYSICIAN GROUP | Age: 39
End: 2024-08-14
Payer: COMMERCIAL

## 2024-08-14 VITALS
OXYGEN SATURATION: 99 % | WEIGHT: 202.58 LBS | DIASTOLIC BLOOD PRESSURE: 102 MMHG | HEART RATE: 98 BPM | TEMPERATURE: 98.7 F | BODY MASS INDEX: 26 KG/M2 | SYSTOLIC BLOOD PRESSURE: 168 MMHG | HEIGHT: 74 IN | RESPIRATION RATE: 18 BRPM

## 2024-08-14 DIAGNOSIS — R00.2 PALPITATIONS: ICD-10-CM

## 2024-08-14 DIAGNOSIS — R03.0 ELEVATED BLOOD PRESSURE READING: ICD-10-CM

## 2024-08-14 PROCEDURE — 93000 ELECTROCARDIOGRAM COMPLETE: CPT | Performed by: STUDENT IN AN ORGANIZED HEALTH CARE EDUCATION/TRAINING PROGRAM

## 2024-08-14 PROCEDURE — 99214 OFFICE O/P EST MOD 30 MIN: CPT | Performed by: STUDENT IN AN ORGANIZED HEALTH CARE EDUCATION/TRAINING PROGRAM

## 2024-08-14 PROCEDURE — 3077F SYST BP >= 140 MM HG: CPT | Performed by: STUDENT IN AN ORGANIZED HEALTH CARE EDUCATION/TRAINING PROGRAM

## 2024-08-14 PROCEDURE — 3080F DIAST BP >= 90 MM HG: CPT | Performed by: STUDENT IN AN ORGANIZED HEALTH CARE EDUCATION/TRAINING PROGRAM

## 2024-08-15 NOTE — PROGRESS NOTES
Subjective:   CHIEF COMPLAINT  Chief Complaint   Patient presents with    Other     HIGH Heart palpitation x 3 days        HPI  Cordell Kent is a 39 y.o. male who presents with a chief complaint of palpitations.  States symptoms have been ongoing for several months however become more noticeable over the last week.  Describes symptoms as feeling like a rapid, strong heartbeat that he can feel in his neck.  Says sometimes he also feels like his heart momentarily stops.  Symptoms are sporadic without any reliable triggers or aggravating factors.  No alleviating factors and symptoms spontaneously resolved.  Denies any syncopal episodes.  No history of exertional chest pain.  No associated symptoms of nausea, vomiting or shortness of breath.  He has no chronic medical condition does not take any medications.  No reflux.  History of whitecoat hypertension, checks his blood pressure at home which is well within normal limits typically running around 120/80.    REVIEW OF SYSTEMS  General: no fever or chills  GI: no nausea or vomiting  See HPI for further details.    PAST MEDICAL HISTORY  Patient Active Problem List    Diagnosis Date Noted    Blood in stool 09/08/2023    CSF leak 12/29/2022    Chronic sphenoidal sinusitis 12/29/2022    Cough 11/29/2022    Dyslipidemia 11/29/2022       SURGICAL HISTORY   has a past surgical history that includes tonsillectomy and other orthopedic surgery (Left).    ALLERGIES  No Known Allergies    CURRENT MEDICATIONS  This patient does not have an active medication from one of the medication groupers.    SOCIAL HISTORY  Social History     Tobacco Use    Smoking status: Never    Smokeless tobacco: Never   Vaping Use    Vaping status: Never Used   Substance and Sexual Activity    Alcohol use: Yes     Alcohol/week: 0.6 - 1.2 oz     Types: 1 - 2 Standard drinks or equivalent per week     Comment: 3-4 drinks 3-4 nights/wk    Drug use: Never    Sexual activity: Yes     Partners: Female     Birth  "control/protection: Female Sterilization     Comment: wife       FAMILY HISTORY  Family History   Problem Relation Age of Onset    Breast Cancer Maternal Grandmother     Ovarian Cancer Neg Hx     Tubal Cancer Neg Hx     Peritoneal Cancer Neg Hx     Colorectal Cancer Neg Hx           Objective:   PHYSICAL EXAM  VITAL SIGNS: BP (!) 168/102   Pulse 98   Temp 37.1 °C (98.7 °F) (Temporal)   Resp 18   Ht 1.867 m (6' 1.5\")   Wt 91.9 kg (202 lb 9.3 oz)   SpO2 99%   BMI 26.36 kg/m²     Gen: no acute distress, normal voice  Skin: dry, intact, moist mucosal membranes  Eyes: No conjunctival injection bilaterally.  Neck: Normal range of motion. No meningeal signs.   Lungs: No increased work of breathing.  CTAB w/ symmetric expansion  CV: RRR w/o murmurs or clicks  Psych: normal affect, normal judgement, alert, awake    EC2024  82 bpm.  NSR.  Normal axis  No P wave abnormalities  No ST segment elevation or depression  No T wave abnormalities    Assessment/Plan:     1. Palpitations  EKG - Clinic Performed    REFERRAL TO CARDIOLOGY      2. Elevated blood pressure reading        1) ECG without any acute ischemic changes.  One of the printouts demonstrated some PVCs but were otherwise unremarkable.  Currently asymptomatic.  Patient was very well-appearing and nontoxic appearing.  Ordered referral to follow-up with cardiology for further evaluation.  If develop any new or worsening symptoms next step is the emergency room.  Patient understood everything discussed and all questions were answered.      2) likely underlying whitecoat hypertension.  States he checks his blood pressure at home which is always well within normal limits.  Instructed follow-up with his PCP for continued continued monitoring.    31 minutes was spent caring for this patient on the day of the encounter which included review of previous medical records, face-to-face time, discussing the diagnosis, medical management, follow-up, emergency room " precautions and completion of the chart. This does not include time spent on separately billable procedures/tests.      Please note that this dictation was created using voice recognition software. I have made a reasonable attempt to correct obvious errors, but I expect that there are errors of grammar and possibly content that I did not discover before finalizing the note.

## 2024-08-21 ENCOUNTER — TELEPHONE (OUTPATIENT)
Dept: HEALTH INFORMATION MANAGEMENT | Facility: OTHER | Age: 39
End: 2024-08-21
Payer: COMMERCIAL

## 2024-09-27 ENCOUNTER — TELEPHONE (OUTPATIENT)
Dept: CARDIOLOGY | Facility: MEDICAL CENTER | Age: 39
End: 2024-09-27
Payer: COMMERCIAL

## 2024-09-27 NOTE — TELEPHONE ENCOUNTER
Spoke to patient in regards to obtaining records for NP appointment with Dr. Kwon. Per patient has never been treated by a previous cardiologist. Confirmed all recent notes, labs, and cardiac imaging are in Epic. Confirmed with patient appointment time, location, and date.

## 2024-10-03 ENCOUNTER — TELEPHONE (OUTPATIENT)
Dept: CARDIOLOGY | Facility: MEDICAL CENTER | Age: 39
End: 2024-10-03
Payer: COMMERCIAL

## 2024-10-04 ENCOUNTER — OFFICE VISIT (OUTPATIENT)
Dept: CARDIOLOGY | Facility: MEDICAL CENTER | Age: 39
End: 2024-10-04
Attending: INTERNAL MEDICINE
Payer: COMMERCIAL

## 2024-10-04 VITALS
HEART RATE: 106 BPM | DIASTOLIC BLOOD PRESSURE: 68 MMHG | SYSTOLIC BLOOD PRESSURE: 130 MMHG | WEIGHT: 202 LBS | OXYGEN SATURATION: 97 % | RESPIRATION RATE: 16 BRPM | BODY MASS INDEX: 26.77 KG/M2 | HEIGHT: 73 IN

## 2024-10-04 DIAGNOSIS — R00.2 PALPITATIONS: ICD-10-CM

## 2024-10-04 DIAGNOSIS — E78.5 DYSLIPIDEMIA: ICD-10-CM

## 2024-10-04 DIAGNOSIS — Z13.220 SCREENING FOR LIPID DISORDERS: ICD-10-CM

## 2024-10-04 PROCEDURE — 99203 OFFICE O/P NEW LOW 30 MIN: CPT | Performed by: INTERNAL MEDICINE

## 2024-10-04 PROCEDURE — 99211 OFF/OP EST MAY X REQ PHY/QHP: CPT | Performed by: INTERNAL MEDICINE

## 2024-10-04 ASSESSMENT — ENCOUNTER SYMPTOMS
COUGH: 0
PALPITATIONS: 1
FEVER: 0
FOCAL WEAKNESS: 0
CHILLS: 0
ABDOMINAL PAIN: 1
FALLS: 0
CLAUDICATION: 0
DIZZINESS: 0
WEAKNESS: 0
BRUISES/BLEEDS EASILY: 0
NAUSEA: 0
PND: 0
SORE THROAT: 0
BLURRED VISION: 0
SHORTNESS OF BREATH: 0

## 2024-10-08 ENCOUNTER — APPOINTMENT (OUTPATIENT)
Dept: CARDIOLOGY | Facility: MEDICAL CENTER | Age: 39
End: 2024-10-08
Attending: STUDENT IN AN ORGANIZED HEALTH CARE EDUCATION/TRAINING PROGRAM
Payer: COMMERCIAL

## 2025-03-17 ENCOUNTER — OFFICE VISIT (OUTPATIENT)
Dept: MEDICAL GROUP | Facility: PHYSICIAN GROUP | Age: 40
End: 2025-03-17
Payer: COMMERCIAL

## 2025-03-17 ENCOUNTER — HOSPITAL ENCOUNTER (OUTPATIENT)
Dept: LAB | Facility: MEDICAL CENTER | Age: 40
End: 2025-03-17
Attending: STUDENT IN AN ORGANIZED HEALTH CARE EDUCATION/TRAINING PROGRAM
Payer: COMMERCIAL

## 2025-03-17 VITALS
DIASTOLIC BLOOD PRESSURE: 88 MMHG | SYSTOLIC BLOOD PRESSURE: 144 MMHG | HEIGHT: 73 IN | BODY MASS INDEX: 28.37 KG/M2 | OXYGEN SATURATION: 98 % | TEMPERATURE: 98.2 F | WEIGHT: 214.1 LBS | HEART RATE: 97 BPM

## 2025-03-17 DIAGNOSIS — E78.5 DYSLIPIDEMIA: ICD-10-CM

## 2025-03-17 DIAGNOSIS — R79.89 LOW SERUM CORTISOL LEVEL: ICD-10-CM

## 2025-03-17 DIAGNOSIS — R59.9 ENLARGED LYMPH NODES: ICD-10-CM

## 2025-03-17 DIAGNOSIS — R79.89 ELEVATED FERRITIN: ICD-10-CM

## 2025-03-17 LAB
BASOPHILS # BLD AUTO: 0.7 % (ref 0–1.8)
BASOPHILS # BLD: 0.04 K/UL (ref 0–0.12)
CORTIS SERPL-MCNC: 19.5 UG/DL (ref 0–23)
EOSINOPHIL # BLD AUTO: 0.22 K/UL (ref 0–0.51)
EOSINOPHIL NFR BLD: 4 % (ref 0–6.9)
ERYTHROCYTE [DISTWIDTH] IN BLOOD BY AUTOMATED COUNT: 41.7 FL (ref 35.9–50)
FERRITIN SERPL-MCNC: 432 NG/ML (ref 22–322)
HCT VFR BLD AUTO: 48.3 % (ref 42–52)
HGB BLD-MCNC: 16 G/DL (ref 14–18)
IMM GRANULOCYTES # BLD AUTO: 0.02 K/UL (ref 0–0.11)
IMM GRANULOCYTES NFR BLD AUTO: 0.4 % (ref 0–0.9)
LYMPHOCYTES # BLD AUTO: 2.11 K/UL (ref 1–4.8)
LYMPHOCYTES NFR BLD: 38.6 % (ref 22–41)
MCH RBC QN AUTO: 29.7 PG (ref 27–33)
MCHC RBC AUTO-ENTMCNC: 33.1 G/DL (ref 32.3–36.5)
MCV RBC AUTO: 89.8 FL (ref 81.4–97.8)
MONOCYTES # BLD AUTO: 0.59 K/UL (ref 0–0.85)
MONOCYTES NFR BLD AUTO: 10.8 % (ref 0–13.4)
NEUTROPHILS # BLD AUTO: 2.49 K/UL (ref 1.82–7.42)
NEUTROPHILS NFR BLD: 45.5 % (ref 44–72)
NRBC # BLD AUTO: 0 K/UL
NRBC BLD-RTO: 0 /100 WBC (ref 0–0.2)
PLATELET # BLD AUTO: 302 K/UL (ref 164–446)
PMV BLD AUTO: 10.3 FL (ref 9–12.9)
RBC # BLD AUTO: 5.38 M/UL (ref 4.7–6.1)
WBC # BLD AUTO: 5.5 K/UL (ref 4.8–10.8)

## 2025-03-17 PROCEDURE — 36415 COLL VENOUS BLD VENIPUNCTURE: CPT

## 2025-03-17 PROCEDURE — 85025 COMPLETE CBC W/AUTO DIFF WBC: CPT

## 2025-03-17 PROCEDURE — 82728 ASSAY OF FERRITIN: CPT

## 2025-03-17 PROCEDURE — 3077F SYST BP >= 140 MM HG: CPT | Performed by: STUDENT IN AN ORGANIZED HEALTH CARE EDUCATION/TRAINING PROGRAM

## 2025-03-17 PROCEDURE — 82533 TOTAL CORTISOL: CPT

## 2025-03-17 PROCEDURE — 99214 OFFICE O/P EST MOD 30 MIN: CPT | Performed by: STUDENT IN AN ORGANIZED HEALTH CARE EDUCATION/TRAINING PROGRAM

## 2025-03-17 PROCEDURE — 3079F DIAST BP 80-89 MM HG: CPT | Performed by: STUDENT IN AN ORGANIZED HEALTH CARE EDUCATION/TRAINING PROGRAM

## 2025-03-17 NOTE — PROGRESS NOTES
"Subjective:     Chief Complaint   Patient presents with    Bump     Lump under under arm Rside , necl 3 weeks right side.        History of Present Illness  The patient presents for evaluation of right axillary lymphadenopathy, elevated blood pressure, and elevated cholesterol.    He experienced pain in his right axilla approximately 1 month ago after returning from [place], which he initially attributed to a swollen lymph node. The pain intensified over several days but subsided after a week. He subsequently felt an an enlarged lymph node below his right ear, which has since decreased in size. He reports no recent vaccinations or illnesses. He has a history of dental issues, including a poorly executed root canal that was subsequently corrected after he developed a CSF leak. He also reports no fever or weight loss. He has been cohabiting with a cat for the past 15 years without any incidents of cat scratches.    He has been monitoring his blood pressure at home, which typically reads around 128 systolic. He acknowledges experiencing anxiety during doctor's visits, which may contribute to elevated readings.    He underwent comprehensive blood work in 10/2024, which revealed elevated cholesterol, elevated ferritin, and low cortisol levels. He believes his elevated cholesterol is due to poor dietary habits and lack of exercise. He has a history of shoulder injury, which has limited his physical activity.    Supplemental Information  He had a colonoscopy last year due to blood in his stool, which revealed a small internal hemorrhoid.    FAMILY HISTORY  His grandmother had breast cancer in her mid to late 70s.        Health Maintenance: Completed    ROS:  Negative except as stated above.      Objective:     Exam:  BP (!) 144/88 (BP Location: Left arm, Patient Position: Sitting, BP Cuff Size: Adult)   Pulse 97   Temp 36.8 °C (98.2 °F)   Ht 1.854 m (6' 1\")   Wt 97.1 kg (214 lb 1.6 oz)   SpO2 98%   BMI 28.25 kg/m²  " Body mass index is 28.25 kg/m².    Physical Exam    Gen: Alert and oriented, no acute distress.  Skin:    Palpable lymph node below right ear but no other axillary, supraclavicular, or cervical lymphadenopathy bilaterally.  Lungs: Normal effort, CTAB, no wheezing / rhonchi / rales.  CV: RRR, normal S1 and S2, no murmurs.      Assessment & Plan:     40 y.o. male with the following -     1. Enlarged lymph nodes  This is a new problem.  Onset 1 month ago in right axilla, which resolved, and then presented below right ear.  He denies recent illness or vaccine but recently traveled to [place].  He denies dental issues or cat scratches.  He reports reduction in size.  CBC ordered to rule out hematologic cancer.  October 2024 PSA WNL and he reports colonoscopy last year was negative.  He denies any testicular pain or masses.  Diagnostic mammogram ordered.  - CBC WITH DIFFERENTIAL; Future  - MA-DIAGNOSTIC MAMMO BILAT W/TOMOSYNTHESIS W/CAD; Future    2. Low serum cortisol level  This is a new problem.  Labs done in October showed low cortisol level and repeat ordered.  - CORTISOL - AM    3. Elevated ferritin  This is a new problem.  Labs done in October showed elevated ferritin with normal H&H and iron studies.  Repeat ordered.  - FERRITIN; Future    4. Dyslipidemia  Chronic, uncontrolled.  Oct 2024 T 243, , DL 31,  with elevated apolipoprotein B.  He attributes elevated cholesterol to poor diet and lack of exercise.  Statin not indicated at this age.  Advised to limit saturated / trans fat and exercise 150 minutes weekly.  Consider referral to lipid clinic or cardiac CT.        I spent a total of 30 minutes with record review, exam, communication with the patient, communication with other providers, and documentation of this encounter.      Return in about 6 weeks (around 4/28/2025).    Verbal consent was acquired by the patient to use [employer] ambient listening note generation during this visit:  Yes.    Please note that this dictation was created using voice recognition software. I have made every reasonable attempt to correct obvious errors, but I expect that there are errors of grammar and possibly content that I did not discover before finalizing the note.

## 2025-03-17 NOTE — LETTER
[facility]  Evelyn Nesbitt M.D.  [address] [place] [address]  Fax: [phone]   Authorization for Release/Disclosure of   Protected Health Information   Name: HEDY TONG : 1985 SSN: [id number]   Address:  [address] [place] [address] Phone:    There are no phone numbers on file.   I authorize the entity listed below to release/disclose the PHI below to:   [facility]/Evelyn Nesbitt M.D. and Evelyn Nesbitt M.D.   Provider or Entity Name:  [facility]   Address   City, State, Zip   Phone:      Fax:     Reason for request: continuity of care   Information to be released:    [  ] LAST COLONOSCOPY,  including any PATH REPORT and follow-up  [  ] LAST FIT/COLOGUARD RESULT [  ] LAST DEXA  [  ] LAST MAMMOGRAM  [  ] LAST PAP  [ X ] LAST LABS [  ] RETINA EXAM REPORT  [  ] IMMUNIZATION RECORDS  [  ] Release all info      [  ] Check here and initial the line next to each item to release ALL health information INCLUDING  _____ Care and treatment for drug and / or alcohol abuse  _____ HIV testing, infection status, or AIDS  _____ Genetic Testing    DATES OF SERVICE OR TIME PERIOD TO BE DISCLOSED: _____________  I understand and acknowledge that:  * This Authorization may be revoked at any time by you in writing, except if your health information has already been used or disclosed.  * Your health information that will be used or disclosed as a result of you signing this authorization could be re-disclosed by the recipient. If this occurs, your re-disclosed health information may no longer be protected by State or Federal laws.  * You may refuse to sign this Authorization. Your refusal will not affect your ability to obtain treatment.  * This Authorization becomes effective upon signing and will  on (date) __________.      If no date is indicated, this Authorization will  one (1) year from the signature date.    Name: Hedy Tong  Signature: Date:   3/17/2025     PLEASE FAX REQUESTED  RECORDS BACK TO: [phone]

## 2025-03-18 ENCOUNTER — RESULTS FOLLOW-UP (OUTPATIENT)
Dept: MEDICAL GROUP | Facility: PHYSICIAN GROUP | Age: 40
End: 2025-03-18

## 2025-04-08 ENCOUNTER — HOSPITAL ENCOUNTER (OUTPATIENT)
Dept: RADIOLOGY | Facility: MEDICAL CENTER | Age: 40
End: 2025-04-08
Attending: STUDENT IN AN ORGANIZED HEALTH CARE EDUCATION/TRAINING PROGRAM
Payer: COMMERCIAL

## 2025-04-08 DIAGNOSIS — R59.9 ENLARGED LYMPH NODES: ICD-10-CM

## 2025-04-08 PROCEDURE — 76642 ULTRASOUND BREAST LIMITED: CPT | Mod: RT

## 2025-04-10 ENCOUNTER — RESULTS FOLLOW-UP (OUTPATIENT)
Dept: MEDICAL GROUP | Facility: PHYSICIAN GROUP | Age: 40
End: 2025-04-10

## 2025-04-13 DIAGNOSIS — R22.1 LUMP ON NECK: ICD-10-CM

## 2025-04-30 ENCOUNTER — HOSPITAL ENCOUNTER (OUTPATIENT)
Dept: RADIOLOGY | Facility: MEDICAL CENTER | Age: 40
End: 2025-04-30
Attending: STUDENT IN AN ORGANIZED HEALTH CARE EDUCATION/TRAINING PROGRAM
Payer: COMMERCIAL

## 2025-04-30 DIAGNOSIS — R22.1 LUMP ON NECK: ICD-10-CM

## 2025-04-30 PROCEDURE — 76536 US EXAM OF HEAD AND NECK: CPT

## 2025-05-10 ENCOUNTER — RESULTS FOLLOW-UP (OUTPATIENT)
Dept: MEDICAL GROUP | Facility: PHYSICIAN GROUP | Age: 40
End: 2025-05-10